# Patient Record
Sex: MALE | Race: WHITE | URBAN - METROPOLITAN AREA
[De-identification: names, ages, dates, MRNs, and addresses within clinical notes are randomized per-mention and may not be internally consistent; named-entity substitution may affect disease eponyms.]

---

## 2017-01-06 ENCOUNTER — HOSPITAL ENCOUNTER (EMERGENCY)
Facility: CLINIC | Age: 32
Discharge: HOME | End: 2017-01-06
Attending: EMERGENCY MEDICINE | Admitting: EMERGENCY MEDICINE
Payer: COMMERCIAL

## 2017-01-06 VITALS
TEMPERATURE: 97.8 F | RESPIRATION RATE: 18 BRPM | BODY MASS INDEX: 34.07 KG/M2 | WEIGHT: 230 LBS | SYSTOLIC BLOOD PRESSURE: 138 MMHG | HEIGHT: 69 IN | DIASTOLIC BLOOD PRESSURE: 91 MMHG | OXYGEN SATURATION: 100 % | HEART RATE: 71 BPM

## 2017-01-06 DIAGNOSIS — S86.112A GASTROCNEMIUS MUSCLE TEAR, LEFT, INITIAL ENCOUNTER: ICD-10-CM

## 2017-01-06 PROCEDURE — 76882 US LMTD JT/FCL EVL NVASC XTR: CPT | Mod: LT

## 2017-01-06 PROCEDURE — 99284 EMERGENCY DEPT VISIT MOD MDM: CPT | Mod: 25

## 2017-01-06 PROCEDURE — 25000132 ZZH RX MED GY IP 250 OP 250 PS 637: Performed by: EMERGENCY MEDICINE

## 2017-01-06 RX ORDER — HYDROCODONE BITARTRATE AND ACETAMINOPHEN 5; 325 MG/1; MG/1
2 TABLET ORAL ONCE
Status: COMPLETED | OUTPATIENT
Start: 2017-01-06 | End: 2017-01-06

## 2017-01-06 RX ORDER — HYDROCODONE BITARTRATE AND ACETAMINOPHEN 5; 325 MG/1; MG/1
1-2 TABLET ORAL EVERY 4 HOURS PRN
Qty: 15 TABLET | Refills: 0 | Status: SHIPPED | OUTPATIENT
Start: 2017-01-06

## 2017-01-06 RX ORDER — IBUPROFEN 600 MG/1
600 TABLET, FILM COATED ORAL ONCE
Status: COMPLETED | OUTPATIENT
Start: 2017-01-06 | End: 2017-01-06

## 2017-01-06 RX ADMIN — IBUPROFEN 600 MG: 600 TABLET ORAL at 15:38

## 2017-01-06 RX ADMIN — HYDROCODONE BITARTRATE AND ACETAMINOPHEN 2 TABLET: 5; 325 TABLET ORAL at 15:38

## 2017-01-06 ASSESSMENT — ENCOUNTER SYMPTOMS
WEAKNESS: 1
NUMBNESS: 1

## 2017-01-06 NOTE — DISCHARGE INSTRUCTIONS
Please make an appointment to follow up with [facility] [phone] in 5-7 days if not improving.    Opioid Medication Information    You have been given a prescription for an opioid (narcotic) pain medicine and/or have received a pain medicine while here in the Emergency Department. These medicines can make you drowsy or impaired. You must not drive, operate dangerous equipment, or engage in any other dangerous activities while taking these medications. If you drive while taking these medications, you could be arrested for DUI, or driving under the influence. Do not drink any alcohol while you are taking these medications.   Opioid pain medications can cause addiction. If you have a history of chemical dependency of any type, you are at a higher risk of becoming addicted to pain medications.  Only take these prescribed medications to treat your pain when all other options have been tried. Take it for as short a time and as few doses as possible. Store your pain pills in a secure place, as they are frequently stolen and provide a dangerous opportunity for children or visitors in your house to start abusing these powerful medications. We will not replace any lost or stolen medicine.  As soon as your pain is better, you should flush all your remaining medication.   Many prescription pain medications contain Tylenol  (acetaminophen), including Vicodin , Tylenol #3 , Norco , Lortab , and Percocet .  You should not take any extra pills of Tylenol  if you are using these prescription medications or you can get very sick.  Do not ever take more than 4000 mg of acetaminophen in any 24 hour period.  All opioids tend to cause constipation. Drink plenty of water and eat foods that have a lot of fiber, such as fruits, vegetables, prune juice, apple juice and high fiber cereal.  Take a laxative if you don t move your bowels at least every other day. Miralax , Milk of Magnesia, Colace , or Senna  can be used to keep you  regular.        When able, elevate your calf above the level of your heart to help with swelling    Use ice bags for 15-20 minutes every 1-2 hours for next 12-24 hours to help with swelling    You can also use an ace bandage for comfort and help with swelling    Avoid activities that cause increased pain     gentle range of motion is okay, and actually helpful to get you back to work/school faster.    Weight bear as tolerated by pain, use crutches/boot until then        Muscle Strain in the Extremities  A muscle strain is a stretching and tearing of muscle fibers. This causes pain, especially when you move that muscle. There may also be some swelling and bruising.  Home care    Keep the hurt area raised to reduce pain and swelling. This is especially important during the first 48 hours.    Apply an ice pack over the injured area for 15 to 20 minutes every 3 to 6 hours. You should do this for the first 24 to 48 hours. You can make an ice pack by filling a plastic bag that seals at the top with ice cubes and then wrapping it with a thin towel. Be careful not to injure your skin with the ice treatments. Ice should never be applied directly to skin. Continue the use of ice packs for relief of pain and swelling as needed. After 48 hours, apply heat (warm shower or warm bath) for 15 to 20 minutes several times a day, or alternate ice and heat.    You may use over-the-counter pain medicine to control pain, unless another medicine was prescribed. If you have chronic liver or kidney disease or ever had a stomach ulcer or GI bleeding, talk with your healthcare provider before using these medicines.    For leg strains: If crutches have been recommended, don t put full weight on the hurt leg until you can do so without pain. You can return to sports when you are able to hop and run on the injured leg without pain.  Follow-up care  Follow up with your healthcare provider, or as advised.  When to seek medical advice  Call your  healthcare provider right away if any of these occur:    The toes of the injured leg become swollen, cold, blue, numb, or tingly    Pain or swelling increases    5606-9107 [employer]. [address] [place] [address], [place] [address]. All rights reserved. This information is not intended as a substitute for professional medical care. Always follow your healthcare professional's instructions.

## 2017-01-06 NOTE — ED AVS SNAPSHOT
[facility] Emergency Department    [address]    [facility] [address]    Phone:  [phone]    Fax:  [phone]                                       Ramesh Flores   MRN: [id number]    Department:  [facility] Emergency Department   Date of Visit:  1/6/2017           After Visit Summary Signature Page     I have received my discharge instructions, and my questions have been answered. I have discussed any challenges I see with this plan with the nurse or doctor.    ..........................................................................................................................................  Patient/Patient Representative Signature      ..........................................................................................................................................  Patient Representative Print Name and Relationship to Patient    ..................................................               ................................................  Date                                            Time    ..........................................................................................................................................  Reviewed by Signature/Title    ...................................................              ..............................................  Date                                                            Time

## 2017-01-06 NOTE — ED PROVIDER NOTES
"  History     Chief Complaint:  Leg Injury    HPI   Ramesh Flores is a 31 year old male who presents to the emergency department today for evaluation of left leg pain. The patient was playing basketball last night and heard a \"pop\" and felt a \"tear\" in his left calf. He was ambulate on the leg but with pain. He has no pain above the knee. The patient feels numbness and weakness in his foot. He notes that his foot has been colder than the rest of his body.     Allergies:  Aspirin  Penicillins     Medications:    The patient is currently on no regular medications.     Past Medical History:    History reviewed. No pertinent past medical history.     Past Surgical History:    Left ankle surgery      Family History:    History reviewed. No pertinent family history.      Social History:  Marital Status:       Review of Systems   Musculoskeletal:        Left calf pain   Neurological: Positive for weakness and numbness.   All other systems reviewed and are negative.    Physical Exam   Vitals:  Patient Vitals for the past 24 hrs:   BP Temp Temp src Pulse Resp SpO2 Height Weight   01/06/17 1540 (!) 138/91 mmHg - - - - 100 % - -   01/06/17 1506 - - - - - 100 % - -   01/06/17 1505 (!) 137/95 mmHg - - - - - - -   01/06/17 1452 (!) 142/96 mmHg 97.8  F (36.6  C) Oral 71 18 98 % 1.753 m (5' 9\") 104.327 kg (230 lb)       Physical Exam    HEENT:  mmm  Neck: supple  CV: ppi, regular   Resp: speaking in full sentences with any resp distress    Ext: LLE - + ttp posterior lower leg most prominent prox gastro/soleus complex, less so at distal gastroc/soleus complex.  There is no knee effusion.  No ttp over malleoli.  No palpable defect in achilles tendon, normal [place] test.  Distal pulses pt/dp pulses intact.  SILT.    Skin: warm dry well perfused  Neuro: Alert, no gross motor or sensory deficits,  gait stable        Emergency Department Course     Imaging:  Radiology findings were communicated with the patient who " voiced understanding of the findings.    POC US SOFT TISSUE   Final Result   PROCEDURE NOTE --> Emergency Bedside Ultrasound      Procedure Name: FLORECITA Soft Tissue US      Preformed by: Yemi Nguyễn MD      Indication - L Lower leg injury     Suspicion of achilles tendon   injury      Probe:  high frequency linear array      Windows - transverse and sagittal views L posterior leg from   achilles insertion at calc proximally to knee      Findings - no achilles tendon disruption, no full thickness   muscle tear, no obvious visualized popliteal arterial injury      Impression - No achilles tendon or popliteal artery injury      Images saved on ultrasound internal hard drive per protocol            Interventions:  1538 Norco 650 mg  1538 Ibuprofen 600 mg oral       Emergency Department Course:  Nursing notes and vitals reviewed.  I performed an exam of the patient as documented above.   The patient was sent for a POC US soft tissue while in the emergency department, results above.   I discussed the treatment plan with the patient. They expressed understanding of this plan and consented to discharge. They will be discharged home with instructions for care and follow up. In addition, the patient will return to the emergency department if their symptoms persist, worsen, if new symptoms arise or if there is any concern.  All questions were answered.   I personally reviewed the imaging results with the patient and answered all related questions prior to discharge.    Impression & Plan      Medical Decision Making:  Ramesh Flores is a 31 year old male here with a left leg injury after coming done from a basketball shot yesterday. On examination he is tender in the calf. Achilles tendon is intact. He has tenderness over the gastrocnemius soleus complex, most of tenderness is in the proximal gastroc. Bedside ultrasound done to evaluate achilles tendon as well as the popliteal artery. His distal profusion is intact  in terms of PT and DP distal pulses. Popliteal artery shows no evidence of dissection. Presentation consistent with muscle tear, no complete injury as his planter flexion/plantar extension mechanism is intact. Plan will be to discharge home with conservative management, he is comfortable with that plan.     Diagnosis:    ICD-10-CM    1. Gastrocnemius muscle tear, left, initial encounter S86.812A      Disposition:   Discharge    Discharge Medications:  New Prescriptions    HYDROCODONE-ACETAMINOPHEN (NORCO) 5-325 MG PER TABLET    Take 1-2 tablets by mouth every 4 hours as needed for pain     Scribe Disclosure:  I, Nato Patel, am serving as a scribe at 3:04 PM on 1/6/2017 to document services personally performed by Yemi Nguyễn, *, based on my observations and the provider's statements to me.   1/6/2017   [facility] EMERGENCY DEPARTMENT        Yemi Nguyễn MD  01/06/17 2046

## 2017-01-06 NOTE — ED AVS SNAPSHOT
[facility] Emergency Department    [address] [place] [address]    Phone:  [phone]    Fax:  [phone]                                       Ramesh Flores   MRN: [id number]    Department:  [facility] Emergency Department   Date of Visit:  1/6/2017           Patient Information     Date Of Birth          1985        Your diagnoses for this visit were:     Gastrocnemius muscle tear, left, initial encounter        You were seen by Yemi Nguyễn MD.        Discharge Instructions       Please make an appointment to follow up with [facility] [phone] in 5-7 days if not improving.    Opioid Medication Information    You have been given a prescription for an opioid (narcotic) pain medicine and/or have received a pain medicine while here in the Emergency Department. These medicines can make you drowsy or impaired. You must not drive, operate dangerous equipment, or engage in any other dangerous activities while taking these medications. If you drive while taking these medications, you could be arrested for DUI, or driving under the influence. Do not drink any alcohol while you are taking these medications.   Opioid pain medications can cause addiction. If you have a history of chemical dependency of any type, you are at a higher risk of becoming addicted to pain medications.  Only take these prescribed medications to treat your pain when all other options have been tried. Take it for as short a time and as few doses as possible. Store your pain pills in a secure place, as they are frequently stolen and provide a dangerous opportunity for children or visitors in your house to start abusing these powerful medications. We will not replace any lost or stolen medicine.  As soon as your pain is better, you should flush all your remaining medication.   Many prescription pain medications contain Tylenol  (acetaminophen), including Vicodin ,  Tylenol #3 , Norco , Lortab , and Percocet .  You should not take any extra pills of Tylenol  if you are using these prescription medications or you can get very sick.  Do not ever take more than 4000 mg of acetaminophen in any 24 hour period.  All opioids tend to cause constipation. Drink plenty of water and eat foods that have a lot of fiber, such as fruits, vegetables, prune juice, apple juice and high fiber cereal.  Take a laxative if you don t move your bowels at least every other day. Miralax , Milk of Magnesia, Colace , or Senna  can be used to keep you regular.        When able, elevate your calf above the level of your heart to help with swelling    Use ice bags for 15-20 minutes every 1-2 hours for next 12-24 hours to help with swelling    You can also use an ace bandage for comfort and help with swelling    Avoid activities that cause increased pain     gentle range of motion is okay, and actually helpful to get you back to work/school faster.    Weight bear as tolerated by pain, use crutches/boot until then        Muscle Strain in the Extremities  A muscle strain is a stretching and tearing of muscle fibers. This causes pain, especially when you move that muscle. There may also be some swelling and bruising.  Home care    Keep the hurt area raised to reduce pain and swelling. This is especially important during the first 48 hours.    Apply an ice pack over the injured area for 15 to 20 minutes every 3 to 6 hours. You should do this for the first 24 to 48 hours. You can make an ice pack by filling a plastic bag that seals at the top with ice cubes and then wrapping it with a thin towel. Be careful not to injure your skin with the ice treatments. Ice should never be applied directly to skin. Continue the use of ice packs for relief of pain and swelling as needed. After 48 hours, apply heat (warm shower or warm bath) for 15 to 20 minutes several times a day, or alternate ice and heat.    You may  use over-the-counter pain medicine to control pain, unless another medicine was prescribed. If you have chronic liver or kidney disease or ever had a stomach ulcer or GI bleeding, talk with your healthcare provider before using these medicines.    For leg strains: If crutches have been recommended, don t put full weight on the hurt leg until you can do so without pain. You can return to sports when you are able to hop and run on the injured leg without pain.  Follow-up care  Follow up with your healthcare provider, or as advised.  When to seek medical advice  Call your healthcare provider right away if any of these occur:    The toes of the injured leg become swollen, cold, blue, numb, or tingly    Pain or swelling increases    1646-7687 [employer]. [address] [place] [address], [place] [address]. All rights reserved. This information is not intended as a substitute for professional medical care. Always follow your healthcare professional's instructions.          24 Hour Appointment Hotline       To make an appointment at any [facility], call [phone] ([phone]). If you don't have a family doctor or clinic, we will help you find one. [facility] clinics are conveniently located to serve the needs of you and your family.             Review of your medicines      START taking        Dose / Directions Last dose taken    HYDROcodone-acetaminophen 5-325 MG per tablet   Commonly known as:  NORCO   Dose:  1-2 tablet   Quantity:  15 tablet        Take 1-2 tablets by mouth every 4 hours as needed for pain   Refills:  0                Prescriptions were sent or printed at these locations (1 Prescription)                   Other Prescriptions                Printed at Department/Unit printer (1 of 1)         HYDROcodone-acetaminophen (NORCO) 5-325 MG per tablet                Procedures and tests performed during your visit     POC US SOFT TISSUE      Orders Needing Specimen Collection     None       Pending Results     Date and Time Order Name Status Description    1/6/2017 1514 POC US SOFT TISSUE In process             Pending Culture Results     No orders found from 1/5/2017 to 1/7/2017.       Test Results from your hospital stay           1/6/2017  3:14 PM - Epic, [facility]                Clinical Quality Measure: Blood Pressure Screening     Your blood pressure was checked while you were in the emergency department today. The last reading we obtained was  BP: (!) 138/91 mmHg . Please read the guidelines below about what these numbers mean and what you should do about them.  If your systolic blood pressure (the top number) is less than 120 and your diastolic blood pressure (the bottom number) is less than 80, then your blood pressure is normal. There is nothing more that you need to do about it.  If your systolic blood pressure (the top number) is 120-139 or your diastolic blood pressure (the bottom number) is 80-89, your blood pressure may be higher than it should be. You should have your blood pressure rechecked within a year by a primary care provider.  If your systolic blood pressure (the top number) is 140 or greater or your diastolic blood pressure (the bottom number) is 90 or greater, you may have high blood pressure. High blood pressure is treatable, but if left untreated over time it can put you at risk for heart attack, stroke, or kidney failure. You should have your blood pressure rechecked by a primary care provider within the next 4 weeks.  If your provider in the emergency department today gave you specific instructions to follow-up with your doctor or provider even sooner than that, you should follow that instruction and not wait for up to 4 weeks for your follow-up visit.        Thank you for choosing [facility]       Thank you for choosing [facility] for your care. Our goal is always to provide you with excellent care. Hearing back from our patients is one way we can continue to improve our  "services. Please take a few minutes to complete the written survey that you may receive in the mail after you visit with us. Thank you!        [employer] Information     [employer] lets you send messages to your doctor, view your test results, renew your prescriptions, schedule appointments and more. To sign up, go to [email or web address] . Click on \"Log in\" on the left side of the screen, which will take you to the Welcome page. Then click on \"Sign up Now\" on the right side of the page.     You will be asked to enter the access code listed below, as well as some personal information. Please follow the directions to create your username and password.     Your access code is: [email or web address]  Expires: 2017  3:59 PM     Your access code will  in 90 days. If you need help or a new code, please call your [facility] clinic or [phone].        Care EveryWhere ID     This is your Care EveryWhere ID. This could be used by other organizations to access your [facility] medical records  [id number]        After Visit Summary       This is your record. Keep this with you and show to your community pharmacist(s) and doctor(s) at your next visit.                  "

## 2017-01-06 NOTE — ED NOTES
"ABCs intact. Pt was playing basketball last night and heard a \"pop\" and felt \"a tear\" in his L calf.     Pt's home meds: see epic  "